# Patient Record
Sex: MALE | Race: WHITE
[De-identification: names, ages, dates, MRNs, and addresses within clinical notes are randomized per-mention and may not be internally consistent; named-entity substitution may affect disease eponyms.]

---

## 2018-08-25 ENCOUNTER — HOSPITAL ENCOUNTER (EMERGENCY)
Dept: HOSPITAL 45 - C.EDB | Age: 58
Discharge: HOME | End: 2018-08-25
Payer: COMMERCIAL

## 2018-08-25 VITALS
HEIGHT: 73.5 IN | HEIGHT: 73.5 IN | WEIGHT: 291.45 LBS | WEIGHT: 291.45 LBS | BODY MASS INDEX: 37.8 KG/M2 | BODY MASS INDEX: 37.8 KG/M2

## 2018-08-25 VITALS
HEART RATE: 75 BPM | TEMPERATURE: 98.24 F | DIASTOLIC BLOOD PRESSURE: 91 MMHG | SYSTOLIC BLOOD PRESSURE: 155 MMHG | OXYGEN SATURATION: 98 %

## 2018-08-25 DIAGNOSIS — S61.214A: Primary | ICD-10-CM

## 2018-08-25 DIAGNOSIS — G89.29: ICD-10-CM

## 2018-08-25 DIAGNOSIS — S61.217A: ICD-10-CM
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
## 2018-08-25 DIAGNOSIS — F17.200: ICD-10-CM

## 2018-08-25 DIAGNOSIS — Z79.899: ICD-10-CM

## 2018-08-25 DIAGNOSIS — Z23: ICD-10-CM

## 2018-08-25 DIAGNOSIS — I10: ICD-10-CM

## 2018-08-25 NOTE — EMERGENCY ROOM VISIT NOTE
ED Visit Note


First contact with patient:  22:41


CHIEF COMPLAINT: Multiple finger lacerations





HISTORY OF PRESENT ILLNESS: This right hand dominant 58-year-old male patient 

presents to the emergency department, ambulatory, approximately 3 hours after 

cutting the tip of the right ring finger and lateral aspect of the left little 

finger.  The patient was cutting cabbage with a sharp knife when he 

accidentally sliced the fingers.  The bleeding has not stopped. Denies weakness 

or numbness of the fingers. The patient rates the pain as sharp and to/10.  The 

patient denies any other injuries. The patient's Tetanus shot is not up to date.





REVIEW OF SYSTEMS: A 6 system review of systems was completed with positives 

and pertinent negatives listed in the HPI. 





ALLERGIES: None





MEDICATIONS: Meloxicam, oxycodone, furosemide, stool softener





PMH: Hypertension, chronic pain





SOCIAL HISTORY: The patient lives locally with family.  He admits to tobacco 

use.  He denies drug, alcohol use.





PHYSICAL EXAM: Vital Signs: Reviewed Nurse's notes, vital signs stable. GENERAL

: This is a 58-year-old white male, in no acute distress, well-developed, well-

nourished.  SKIN:  There is a 0.5 cm long laceration on the distal aspect of 

the right ring finger and a 0.5 cm long laceration on the lateral aspect of the 

left little finger, just lateral to the nail. The edges gape apart with 

traction. There is no foreign material in the wounds and they look clean. There 

is minimal active bleeding. No deep structures such as tendons, bones, or 

significant blood vessels are seen in the base of the wounds.  Normal strength 

and movement of the fingers. Capillary refill less than 2 seconds. Normal 

sensation to light and sharp touch.





EMERGENCY DEPARTMENT COURSE: I examined the patient.  Verbal consent was 

obtained to perform the procedure.  Using sterile technique the wounds were 

cleansed with Betadine. The areas were sterilely draped. A total of 1 ml of 1% 

buffered lidocaine was used to locally anesthetize the lacerations. Once the 

patient was anesthetized, the wounds were copiously irrigated under pressure 

with sterile saline. The wounds were explored and were as described above. The 

right ring finger laceration was repaired using 3 simple interrupted 5-0 nylon 

sutures with the wound edges being well approximated. The left fifth finger 

laceration was repaired using 2 simple interrupted 5-0 nylon sutures with the 

wound edges being well approximated.  The patient tolerated the procedure well. 

Hemostasis was achieved. The areas were cleaned with sterile saline and dressed 

with bacitracin ointment and bandages. The patient was given Tdap immunization. 

The patient was discharged home in good condition. 





I attest that I have personally reviewed the patient's current medication list. 


Patient was found to have normal blood pressure on screening and does not 

require follow-up. 





Differential diagnosis includes laceration, contusion, fracture, sprain/strain, 

tendon or ligament injury, neurovascular compromise, foreign body, assault, and 

others





DIAGNOSIS: Left 5th finger laceration


      Right ring finger laceration, need for tetanus prophylaxis








The chart was completed utilizing Dragon Speech voice recognition software. 

Grammatical errors, random word insertions, pronoun errors, and incomplete 

sentences are an occasional consequence of this system due to software 

limitations, ambient noise, and hardware issues. Any formal questions or 

concerns about the content, text, or information contained within the body of 

this dictation should be directly addressed to the provider for clarification.





Vital Signs











  Date Time  Temp Pulse Resp B/P (MAP) Pulse Ox O2 Delivery O2 Flow Rate FiO2


 


8/25/18 22:36 36.8 73 16 158/94 98 Room Air  











Departure Information


Impression





 Primary Impression:  


 Laceration of right ring finger


 Additional Impressions:  


 Laceration of left little finger


 Need for tetanus, diphtheria, and acellular pertussis (Tdap) vaccine





Dispostion


Home / Self-Care





Condition


GOOD





Referrals


No Doctor, Assigned (PCP)





Patient Instructions


ED Laceration Hand, [facility]





Additional Instructions





You have received 5 total sutures on your fingers. These sutures are NOT 

dissolvable and WILL need to be removed by a health care provider in 10-12 

days. You can return to the Emergency Department or contact your Primary Care 

Provider to have the sutures removed.





Proper wound care is essential for adequate wound healing and infection 

prevention. You can shower and clean the wound with soap and water. Do not 

scour over the wound, pat dry with a towel. Do not submerse the wound (i.e. 

bathe or dish wash) until the sutures have been removed. You can use an 

antibiotic ointment with a dressing over the wound for the next 3-4 days. After 

this time you may leave the wound dry and open to the air. If crust develops 

over the wound you can use a Q-tip to apply a 1:1 peroxide:water solution to 

clean the wound.


   


Look for signs of infection of the wound including: increased pain, swelling, 

foul discharge, streaking, or increased temperature. If any of these are 

noticed you should return to the Emergency Department for further assessment 

and treatment.





As with any laceration you may have received nerve damage to the surrounding 

tissues. This damage may or may not be permanent.





You should keep the area covered with sunscreen for the first 6 months to 1 

year when at risk for exposure to help minimize scarring. 





For pain control, you can use the following over-the-counter medicines (if >13 yo):


Ibuprofen(Motrin, Advil) may be used for fever or pain.  Use 600mg every six 

hours as needed.  Take with food.  Avoid using more than 2400mg in a 24 hour 

period.  Do not use 2400mg per day for more than three consecutive days without 

physician direction.  Prolonged inappropriate use can lead to stomach upset or 

ulcers. 


(AND/OR)


Acetaminophen(Tylenol) may be used for fever or pain.  Use 1000mg every six 

hours as needed.  Avoid using more than 3000mg in a 24 hour period.  





He did receive a tetanus vaccination here in the emergency department.  Please 

inform your primary care provider of this vaccine so it can be marked in her 

medical record.





Return to the emergency department if your symptoms worsen despite treatment 

course outlined above.





Problem Qualifiers








 Primary Impression:  


 Laceration of right ring finger


 Encounter type:  initial encounter  Damage to nail status:  without damage  

Foreign body presence:  without foreign body  Qualified Codes:  S61.214A - 

Laceration without foreign body of right ring finger without damage to nail, 

initial encounter


 Additional Impressions:  


 Laceration of left little finger


 Encounter type:  initial encounter  Damage to nail status:  without damage  

Foreign body presence:  without foreign body  Qualified Codes:  S61.217A - 

Laceration without foreign body of left little finger without damage to nail, 

initial encounter